# Patient Record
Sex: FEMALE | Race: BLACK OR AFRICAN AMERICAN | NOT HISPANIC OR LATINO | Employment: FULL TIME | URBAN - METROPOLITAN AREA
[De-identification: names, ages, dates, MRNs, and addresses within clinical notes are randomized per-mention and may not be internally consistent; named-entity substitution may affect disease eponyms.]

---

## 2017-02-16 ENCOUNTER — HOSPITAL ENCOUNTER (EMERGENCY)
Facility: HOSPITAL | Age: 52
Discharge: HOME | End: 2017-02-16

## 2017-02-16 VITALS
DIASTOLIC BLOOD PRESSURE: 95 MMHG | TEMPERATURE: 99 F | HEART RATE: 72 BPM | OXYGEN SATURATION: 97 % | WEIGHT: 250 LBS | SYSTOLIC BLOOD PRESSURE: 183 MMHG | BODY MASS INDEX: 39.16 KG/M2 | RESPIRATION RATE: 20 BRPM

## 2017-02-16 DIAGNOSIS — L02.11 NECK ABSCESS: Primary | ICD-10-CM

## 2017-02-16 PROCEDURE — 90471 IMMUNIZATION ADMIN: CPT | Performed by: PHYSICIAN ASSISTANT

## 2017-02-16 PROCEDURE — 99283 EMERGENCY DEPT VISIT LOW MDM: CPT | Mod: 25

## 2017-02-16 PROCEDURE — 90715 TDAP VACCINE 7 YRS/> IM: CPT | Performed by: PHYSICIAN ASSISTANT

## 2017-02-16 PROCEDURE — 10060 I&D ABSCESS SIMPLE/SINGLE: CPT

## 2017-02-16 PROCEDURE — 63600175 PHARM REV CODE 636 W HCPCS: Performed by: PHYSICIAN ASSISTANT

## 2017-02-16 PROCEDURE — 96372 THER/PROPH/DIAG INJ SC/IM: CPT

## 2017-02-16 PROCEDURE — 25000003 PHARM REV CODE 250: Performed by: PHYSICIAN ASSISTANT

## 2017-02-16 RX ORDER — KETOROLAC TROMETHAMINE 30 MG/ML
60 INJECTION, SOLUTION INTRAMUSCULAR; INTRAVENOUS
Status: COMPLETED | OUTPATIENT
Start: 2017-02-16 | End: 2017-02-16

## 2017-02-16 RX ORDER — CLINDAMYCIN HYDROCHLORIDE 150 MG/1
300 CAPSULE ORAL 4 TIMES DAILY
Qty: 56 CAPSULE | Refills: 0 | Status: SHIPPED | OUTPATIENT
Start: 2017-02-16 | End: 2017-02-23

## 2017-02-16 RX ORDER — LIDOCAINE HYDROCHLORIDE 10 MG/ML
10 INJECTION, SOLUTION EPIDURAL; INFILTRATION; INTRACAUDAL; PERINEURAL
Status: COMPLETED | OUTPATIENT
Start: 2017-02-16 | End: 2017-02-16

## 2017-02-16 RX ORDER — TRAMADOL HYDROCHLORIDE 50 MG/1
50 TABLET ORAL EVERY 6 HOURS PRN
Qty: 11 TABLET | Refills: 0 | Status: SHIPPED | OUTPATIENT
Start: 2017-02-16 | End: 2017-02-26

## 2017-02-16 RX ADMIN — KETOROLAC TROMETHAMINE 60 MG: 30 INJECTION, SOLUTION INTRAMUSCULAR at 07:02

## 2017-02-16 RX ADMIN — LIDOCAINE HYDROCHLORIDE 100 MG: 10 INJECTION, SOLUTION EPIDURAL; INFILTRATION; INTRACAUDAL; PERINEURAL at 06:02

## 2017-02-16 RX ADMIN — CLOSTRIDIUM TETANI TOXOID ANTIGEN (FORMALDEHYDE INACTIVATED), CORYNEBACTERIUM DIPHTHERIAE TOXOID ANTIGEN (FORMALDEHYDE INACTIVATED), BORDETELLA PERTUSSIS TOXOID ANTIGEN (GLUTARALDEHYDE INACTIVATED), BORDETELLA PERTUSSIS FILAMENTOUS HEMAGGLUTININ ANTIGEN (FORMALDEHYDE INACTIVATED), BORDETELLA PERTUSSIS PERTACTIN ANTIGEN, AND BORDETELLA PERTUSSIS FIMBRIAE 2/3 ANTIGEN 0.5 ML: 5; 2; 2.5; 5; 3; 5 INJECTION, SUSPENSION INTRAMUSCULAR at 06:02

## 2017-02-16 NOTE — ED AVS SNAPSHOT
[facility]  [address] [address] [address]               Hina Nunez   2017  6:00 PM   ED    Description:  Female : 1965   Department:  [facility]           Your Care was Coordinated By:     Provider Role From To    ORLANDO Pierre Physician Assistant [phone] --      Reason for Visit     Abscess           Diagnoses this Visit        Comments    Neck abscess    -  Primary       ED Disposition     None           To Do List           Follow-up Information     Follow up with Ryder Leavitt MD In 2 days.    Specialty:  Family Medicine    Why:  As needed, If symptoms worsen return to ED     Contact information:    [address] [place] [address] [id number] [phone]         These Medications        Disp Refills Start End    clindamycin (CLEOCIN) 150 MG capsule 56 capsule 0 2017    Take 2 capsules (300 mg total) by mouth 4 (four) times daily. - Oral    [facility] [address] [place] [address] [id number] [place] [address] Ph #: [phone]         [facility]     [facility] -  Assistance  Registered nurses in the [facility] provide clinical advisement, health education, appointment booking, and other advisory services.  Call for this free service at [phone].             Medications           Message regarding Medications     Verify the changes and/or additions to your medication regime listed below are the same as discussed with your clinician today.  If any of these changes or additions are incorrect, please notify your healthcare provider.        START taking these NEW medications        Refills    clindamycin (CLEOCIN) 150 MG capsule 0    Sig: Take 2 capsules (300 mg total) by mouth 4 (four) times daily.    Class: Print    Route: Oral      These medications were administered today        Dose Freq    lidocaine (PF) 10 mg/ml (1%) injection 100 mg 10 mL  ED 1 Time    Sig: 10 mLs (100 mg total) by Infiltration route ED 1 Time.    Class: Normal    Route: Infiltration    Cosign for Ordering: Required by Jose Angeles MD    Tdap vaccine injection 0.5 mL 0.5 mL Once    Sig: Inject 0.5 mLs into the muscle once.    Class: Normal    Route: Intramuscular    Cosign for Ordering: Required by OB HIM            Verify that the below list of medications is an accurate representation of the medications you are currently taking.  If none reported, the list may be blank. If incorrect, please contact your healthcare provider. Carry this list with you in case of emergency.           Current Medications     hydrocodone-acetaminophen 10-325mg (NORCO)  mg Tab Take 1 tablet by mouth every 4 (four) hours.    albuterol 90 mcg/actuation inhaler Inhale 1-2 puffs into the lungs every 6 (six) hours as needed for Wheezing.    ciprofloxacin HCl (CIPRO) 500 MG tablet Take 500 mg by mouth every 12 (twelve) hours.    clindamycin (CLEOCIN) 150 MG capsule Take 2 capsules (300 mg total) by mouth 4 (four) times daily.    metronidazole (FLAGYL) 500 MG tablet Take 500 mg by mouth 3 (three) times daily.    olmesartan-amlodipin-hcthiazid (TRIBENZOR) 20-5-12.5 mg Tab Take by mouth once daily.           Clinical Reference Information           Your Vitals Were     BP Pulse Temp Resp Weight Last Period    149/90 72 98.4 °F (36.9 °C) (Oral) 20 113.4 kg (250 lb) 01/29/2017    SpO2 BMI             98% 39.16 kg/m2         Allergies as of 2/16/2017     No Known Allergies      Immunizations Administered on Date of Encounter - 2/16/2017     Name Date Dose VIS Date Route    TDAP 2/16/2017 0.5 mL 2/24/2015 Intramuscular      ED Micro, Lab, POCT     None      ED Imaging Orders     None        Discharge Instructions         Abscess (Incision & Drainage)  An abscess (sometimes called a boil) occurs when bacteria get trapped under the skin and start to grow. Pus forms inside the abscess as  the body responds to the bacteria. An abscess can happen with an insect bite, ingrown hair, blocked oil gland, pimple, cyst, or puncture wound.  Your healthcare provider has drained the pus from your abscess. If the abscess pocket was large, your healthcare provider may have inserted gauze packing. Your provider will need to remove and possibly replace it on your next visit. You may not need antibiotics to treat a simple abscess, unless the infection is spreading into the skin around the wound (cellulitis).  Healing of the wound will take about 1 to 2 weeks, depending on the size of the abscess. Healthy tissue will grow from the bottom and sides of the opening until it seals over.  Home care  These tips can help your wound heal:  · The wound may drain for the first 2 days. Cover the wound with a clean dry dressing. If the dressing becomes soaked with blood or pus, change it.  · If a gauze packing was placed inside the abscess cavity, you may be told to remove it yourself. You may do this in the shower. Once the packing is removed, you should wash the area in the shower or bath 3 to 4 times a day, until the skin opening has closed. Make sure you wash your hands after changing the packing or cleaning the wound.  · If you were prescribed antibiotics, take them as directed until they are all gone.  · You may use acetaminophen or ibuprofen to control pain, unless another pain medicine was prescribed. If you have liver disease or ever had a stomach ulcer, talk with your doctor before using these medicines.  Follow-up care  Follow up with your healthcare provider, or as advised. If a gauze packing was inserted in your wound, it should be removed in 1 to 2 days. Check your wound every day for the signs of worsening infection listed below.  When to seek medical advice  Call your healthcare provider right away if any of these occur:  · Increasing redness or swelling  · Red streaks in the skin leading away from the  wound  · Increasing local pain or swelling  · Continued pus draining from the wound 2 days after treatment  · Fever of 100.4ºF (38ºC) or higher, or as directed by your healthcare provider  · Boil returns when you are at home  Date Last Reviewed: 9/1/2016 © 2000-2016 [employer]. [address] [place] [address]. All rights reserved. This information is not intended as a substitute for professional medical care. Always follow your healthcare professional's instructions.          [facility] Sign-Up     Activating your [facility] account is as easy as 1-2-3!     1) Visit [email or web address], select Sign Up Now, enter this activation code and your date of birth, then select Next.  [email or web address]  Expires: 4/2/2017  7:04 PM      2) Create a username and password to use when you visit [facility] in the future and select a security question in case you lose your password and select Next.    3) Enter your e-mail address and click Sign Up!    Additional Information  If you have questions, please e-mail [email or web address] or call [phone] to talk to our [facility] staff. Remember, [facility] is NOT to be used for urgent needs. For medical emergencies, dial 911.         Smoking Cessation     If you would like to quit smoking:   You may be eligible for free services if you are a [place] resident and started smoking cigarettes before September 1, 1988.  Call the Smoking Cessation Trust (SCT) toll free at (146) 112-0084 or (560) 098-0724.   Call 4-926-QUIT-NOW if you do not meet the above criteria.             [facility] complies with applicable Federal civil rights laws and does not discriminate on the basis of race, color, national origin, age, disability, or sex.        Language Assistance Services     ATTENTION: Language assistance services are available, free of charge. Please call [phone].      ATENCIÓN: Si habla español, tiene a olmos disposición servicios gratuitos de  asistencia lingüística. [facility] [phone].     PHOENIX Ý: N?u b?n nói Ti?ng Vi?t, có các d?ch v? h? tr? ngôn ng? mi?n phí mitzyh cho b?n. G?i s? [phone].

## 2017-02-17 NOTE — ED PROVIDER NOTES
Encounter Date: 2/16/2017       History     Chief Complaint   Patient presents with    Abscess     abscess back of neck for 1 week has been draining      Review of patient's allergies indicates:  No Known Allergies  HPI Comments: Pt reports to ED c/o abscess. Abscess is to posterior neck and has been there since this weekend. Pt says it started this weekend as a little bump and has gotten progressively worse. Associated sxs include drainage and pain.  No alleviating or exacerbating factors.  Pt denies any hx of diabetes, fever, nausea, vomiting, headache, neck stiffness, or any other associated sxs.      The history is provided by the patient.     Past Medical History   Diagnosis Date    Asthma     Fibroids     Hypertension      Past Medical History Pertinent Negatives   Diagnosis Date Noted    Abnormal Pap smear of vagina 6/25/2015    Anemia 6/25/2015    Blood dyscrasia 6/25/2015    Breast cancer 6/25/2015    Complication of anesthesia 6/25/2015    Coronary artery disease 6/25/2015    Deep vein thrombosis 6/25/2015    Diabetes mellitus 6/25/2015    Herpes simplex without mention of complication 6/25/2015    HIV infection 6/25/2015    Hyperlipidemia 6/25/2015    Infertility, female 6/25/2015    Liver disease 6/25/2015    Mental disorder 6/25/2015    Postpartum depression 6/25/2015    Rh incompatibility 6/25/2015    Seizures 6/25/2015    Sickle cell anemia 6/25/2015    Stroke 6/25/2015    Systemic lupus erythematosus 6/25/2015    Thyroid disease 6/25/2015    Trauma 6/25/2015    Unspecified disorder of kidney and ureter 6/25/2015    Varicosities 6/25/2015     Past Surgical History   Procedure Laterality Date    Gallbladder surgery      Cholecystectomy      Dilation and curettage of uterus       Family History   Problem Relation Age of Onset    Diabetes Mother     Hypertension Mother     Hypertension Father     Breast cancer Neg Hx     Cancer Neg Hx     Colon cancer Neg Hx      Eclampsia Neg Hx     Miscarriages / Stillbirths Neg Hx     Ovarian cancer Neg Hx      labor Neg Hx     Stroke Neg Hx      Social History   Substance Use Topics    Smoking status: Current Every Day Smoker     Packs/day: 0.50     Types: Cigarettes    Smokeless tobacco: Never Used    Alcohol use Yes      Comment: social     Review of Systems   Constitutional: Negative for fever.   HENT: Negative for sore throat.    Respiratory: Negative for shortness of breath.    Cardiovascular: Negative for chest pain.   Gastrointestinal: Negative for nausea.   Genitourinary: Negative for dysuria.   Musculoskeletal: Negative for back pain.   Skin: Positive for wound (abscess to neck). Negative for rash.   Neurological: Negative for weakness.   Hematological: Does not bruise/bleed easily.   All other systems reviewed and are negative.      Physical Exam   Initial Vitals   BP Pulse Resp Temp SpO2   17 1757 17 1757 17 1757 17 1757 17 1757   149/90 72 20 98.4 °F (36.9 °C) 98 %     Physical Exam    Nursing note and vitals reviewed.  Constitutional: She appears well-developed and well-nourished.   HENT:   Head: Normocephalic and atraumatic.   Eyes: EOM are normal. Pupils are equal, round, and reactive to light.   Neck: Normal range of motion. Neck supple.   Cardiovascular: Normal rate and regular rhythm.   Pulmonary/Chest: Breath sounds normal.   Abdominal: Soft.   Musculoskeletal: Normal range of motion.   Neurological: She is alert and oriented to person, place, and time.   Skin:   Midline abscess with 4 cm area of induration to posterior neck. No fluctuance or erythema.           ED Course   I & D - Incision and Drainage  Date/Time: 2017 7:02 PM  Performed by: POLLY MIX  Authorized by: AMY RUIZ   Consent Done: Yes  Consent: Verbal consent obtained.  Risks and benefits: risks, benefits and alternatives were discussed  Consent given by: patient  Patient  understanding: patient states understanding of the procedure being performed  Patient identity confirmed: , MRN and name  Type: abscess  Body area: head/neck  Location details: neck  Anesthesia: local infiltration    Anesthesia:  Anesthesia: local infiltration  Local Anesthetic: lidocaine 1% without epinephrine   Anesthetic total: 3 mL  Patient sedated: no  Scalpel size: 11  Incision type: single straight  Complexity: simple  Drainage: pus  Drainage amount: scant  Wound treatment: incision,  drainage,  deloculation,  expression of material and  wound left open  Complications: No  Specimens: No  Implants: No  Patient tolerance: Patient tolerated the procedure well with no immediate complications        Labs Reviewed - No data to display                            ED Course     Clinical Impression:   The encounter diagnosis was Neck abscess.          ORLANDO Pierre  17 [phone]

## 2017-02-17 NOTE — ED NOTES
Pt states no further needs/concerns. resp even, unlabored. No distress noted. Pt AAOx3. ORLANDO Mendez notified of pt's vital signs prior to d/c and PA states ok for discharge. No adverse reaction noted post med admin. Pt denies any pain at this time post shot admin.

## 2017-02-17 NOTE — DISCHARGE INSTRUCTIONS
Abscess (Incision & Drainage)  An abscess (sometimes called a boil) occurs when bacteria get trapped under the skin and start to grow. Pus forms inside the abscess as the body responds to the bacteria. An abscess can happen with an insect bite, ingrown hair, blocked oil gland, pimple, cyst, or puncture wound.  Your healthcare provider has drained the pus from your abscess. If the abscess pocket was large, your healthcare provider may have inserted gauze packing. Your provider will need to remove and possibly replace it on your next visit. You may not need antibiotics to treat a simple abscess, unless the infection is spreading into the skin around the wound (cellulitis).  Healing of the wound will take about 1 to 2 weeks, depending on the size of the abscess. Healthy tissue will grow from the bottom and sides of the opening until it seals over.  Home care  These tips can help your wound heal:  · The wound may drain for the first 2 days. Cover the wound with a clean dry dressing. If the dressing becomes soaked with blood or pus, change it.  · If a gauze packing was placed inside the abscess cavity, you may be told to remove it yourself. You may do this in the shower. Once the packing is removed, you should wash the area in the shower or bath 3 to 4 times a day, until the skin opening has closed. Make sure you wash your hands after changing the packing or cleaning the wound.  · If you were prescribed antibiotics, take them as directed until they are all gone.  · You may use acetaminophen or ibuprofen to control pain, unless another pain medicine was prescribed. If you have liver disease or ever had a stomach ulcer, talk with your doctor before using these medicines.  Follow-up care  Follow up with your healthcare provider, or as advised. If a gauze packing was inserted in your wound, it should be removed in 1 to 2 days. Check your wound every day for the signs of worsening infection listed below.  When to seek  medical advice  Call your healthcare provider right away if any of these occur:  · Increasing redness or swelling  · Red streaks in the skin leading away from the wound  · Increasing local pain or swelling  · Continued pus draining from the wound 2 days after treatment  · Fever of 100.4ºF (38ºC) or higher, or as directed by your healthcare provider  · Boil returns when you are at home  Date Last Reviewed: 9/1/2016  © 4280-1276 [employer]. [address] [place] [address]. All rights reserved. This information is not intended as a substitute for professional medical care. Always follow your healthcare professional's instructions.

## 2017-04-10 ENCOUNTER — HOSPITAL ENCOUNTER (EMERGENCY)
Facility: HOSPITAL | Age: 52
Discharge: HOME | End: 2017-04-10
Attending: EMERGENCY MEDICINE
Payer: COMMERCIAL

## 2017-04-10 VITALS
TEMPERATURE: 99 F | BODY MASS INDEX: 39.37 KG/M2 | OXYGEN SATURATION: 96 % | RESPIRATION RATE: 16 BRPM | WEIGHT: 245 LBS | HEIGHT: 66 IN | SYSTOLIC BLOOD PRESSURE: 131 MMHG | DIASTOLIC BLOOD PRESSURE: 81 MMHG | HEART RATE: 74 BPM

## 2017-04-10 DIAGNOSIS — R05.9 COUGH: ICD-10-CM

## 2017-04-10 DIAGNOSIS — R06.2 WHEEZING: ICD-10-CM

## 2017-04-10 DIAGNOSIS — Z72.0 TOBACCO ABUSE DISORDER: ICD-10-CM

## 2017-04-10 DIAGNOSIS — J41.0 SIMPLE CHRONIC BRONCHITIS: Primary | ICD-10-CM

## 2017-04-10 PROCEDURE — 99900035 HC TECH TIME PER 15 MIN (STAT)

## 2017-04-10 PROCEDURE — 63600175 PHARM REV CODE 636 W HCPCS: Performed by: EMERGENCY MEDICINE

## 2017-04-10 PROCEDURE — 25000242 PHARM REV CODE 250 ALT 637 W/ HCPCS: Performed by: EMERGENCY MEDICINE

## 2017-04-10 PROCEDURE — 94640 AIRWAY INHALATION TREATMENT: CPT

## 2017-04-10 PROCEDURE — 99284 EMERGENCY DEPT VISIT MOD MDM: CPT

## 2017-04-10 RX ORDER — PREDNISONE 20 MG/1
40 TABLET ORAL DAILY
Qty: 10 TABLET | Refills: 0 | Status: SHIPPED | OUTPATIENT
Start: 2017-04-10 | End: 2017-04-15

## 2017-04-10 RX ORDER — ALBUTEROL SULFATE 90 UG/1
1-2 AEROSOL, METERED RESPIRATORY (INHALATION) EVERY 6 HOURS PRN
Qty: 1 INHALER | Refills: 0 | OUTPATIENT
Start: 2017-04-10 | End: 2021-12-03

## 2017-04-10 RX ORDER — PREDNISONE 20 MG/1
60 TABLET ORAL
Status: COMPLETED | OUTPATIENT
Start: 2017-04-10 | End: 2017-04-10

## 2017-04-10 RX ORDER — IPRATROPIUM BROMIDE AND ALBUTEROL SULFATE 2.5; .5 MG/3ML; MG/3ML
3 SOLUTION RESPIRATORY (INHALATION)
Status: COMPLETED | OUTPATIENT
Start: 2017-04-10 | End: 2017-04-10

## 2017-04-10 RX ORDER — BENZONATATE 100 MG/1
100 CAPSULE ORAL 3 TIMES DAILY PRN
Qty: 20 CAPSULE | Refills: 0 | Status: SHIPPED | OUTPATIENT
Start: 2017-04-10 | End: 2017-04-20

## 2017-04-10 RX ADMIN — IPRATROPIUM BROMIDE AND ALBUTEROL SULFATE 3 ML: .5; 3 SOLUTION RESPIRATORY (INHALATION) at 05:04

## 2017-04-10 RX ADMIN — PREDNISONE 60 MG: 20 TABLET ORAL at 05:04

## 2017-04-10 NOTE — ED PROVIDER NOTES
Encounter Date: 4/10/2017       History     Chief Complaint   Patient presents with    Cough     x 1 month     Review of patient's allergies indicates:  No Known Allergies  Patient is a 51 y.o. female presenting with the following complaint: cough. The history is provided by the patient.   Cough   This is a chronic problem. The current episode started several weeks ago. The problem occurs every few hours. The problem has been gradually worsening. The cough is productive of sputum. There has been no fever. Associated symptoms include rhinorrhea and wheezing. Pertinent negatives include no chest pain, no chills, no sweats, no weight loss, no ear congestion, no ear pain, no sore throat, no myalgias and no shortness of breath. She has tried cough syrup and decongestants for the symptoms. She is a smoker. Her past medical history is significant for bronchitis, pneumonia and asthma.     Past Medical History:   Diagnosis Date    Asthma     Fibroids     Hypertension      Past Surgical History:   Procedure Laterality Date    CHOLECYSTECTOMY      DILATION AND CURETTAGE OF UTERUS      GALLBLADDER SURGERY       Family History   Problem Relation Age of Onset    Diabetes Mother     Hypertension Mother     Hypertension Father     Breast cancer Neg Hx     Cancer Neg Hx     Colon cancer Neg Hx     Eclampsia Neg Hx     Miscarriages / Stillbirths Neg Hx     Ovarian cancer Neg Hx      labor Neg Hx     Stroke Neg Hx      Social History   Substance Use Topics    Smoking status: Current Every Day Smoker     Packs/day: 0.50     Types: Cigarettes    Smokeless tobacco: Never Used    Alcohol use Yes      Comment: social     Review of Systems   Constitutional: Negative for chills and weight loss.   HENT: Positive for rhinorrhea. Negative for ear pain and sore throat.    Respiratory: Positive for cough and wheezing. Negative for shortness of breath.    Cardiovascular: Negative for chest pain.   Musculoskeletal:  Negative for myalgias.       Physical Exam   Initial Vitals   BP Pulse Resp Temp SpO2   04/10/17 1620 04/10/17 1620 04/10/17 1620 04/10/17 1620 04/10/17 1620   131/81 80 20 98.6 °F (37 °C) 96 %     Physical Exam    Nursing Notes and Vital Signs Reviewed.  Constitutional:  Well developed, well nourished.  Awake & alert.  She is in no apparent distress  Head:  Atraumatic.  Normocephalic.    Eyes:  PERRL.  EOMI.  Conjunctivae are not pale. No scleral icterus.  ENT:  Mucous membranes are moist and intact.  Oropharynx is clear and symmetric.    Neck:  Supple.  No JVD.  No lymphadenopathy.  Cardiovascular:  Regular rate.  Regular rhythm.  No murmurs, rubs, or gallops.  Distal pulses are 2+ and symmetric.  Pulmonary/Chest:  No evidence of respiratory distress.  Diminished bilaterally with expiratory wheezing, no rales or rhonchi. Left chest wall tenderness.   Abdominal:  Soft and non-distended.  There is no tenderness.  No rebound, guarding, or rigidity.  No organomegaly.  Good bowel sounds.    Genitourinary: No CVA tenderness.  Musculoskeletal:  No edema.   No cyanosis.  No clubbing.  Moves all four extremities.   No calf tenderness.  Skin:  Skin is warm and dry.      Neurological:  Alert, awake, and appropriate.  Normal speech.  No acute focal neurological deficits are appreciated.  Psychiatric:  Good eye contact.  Appropriate in content/context. Normal affect.    ED Course   Procedures  Labs Reviewed - No data to display     Imaging Results         X-Ray Chest PA And Lateral (Final result) Result time:  04/10/17 17:26:15    Final result by Ousmane Mathias Jr., MD (04/10/17 17:26:15)    Impression:          No acute findings       Electronically signed by: OUSMANE MATHIAS MD  Date:     04/10/17  Time:    17:26     Narrative:    EXAM:   XR CHEST PA AND LATERAL    CLINICAL HISTORY:   Cough    COMPARISON:  None    FINDINGS:   Heart size and pulmonary vascularity appear normal. Lungs are well aerated and appear clear. No  suspicious mass, infiltrate or effusion.                    Additional MDM:   Smoking Cessation: The patient is a smoker. The patient was counseled on smoking cessation for: 3 minutes. The patient was counseled on tobacco related  health complications.                 ED Course       5:31 PM Reassessed the pt.  The pt is resting comfortably and is NAD.  Discussed test results, shared treatment plan, specific conditions for return, and the need for f/u.  Answered their questions at this time.  Pt understands and agrees to the plan.  The pt has remained hemodynamically stable through ED course and is stable for discharge.     Clinical Impression:     1. Simple chronic bronchitis    2. Cough    3. Tobacco abuse disorder    4. Wheezing        Disposition:   Disposition: Discharged  Condition: Stable       Carolee Agarwal MD  04/10/17 1732       Carolee Agarwal MD  04/10/17 1730

## 2017-04-10 NOTE — ED NOTES
LOC: The patient is awake, alert and aware of environment with an appropriate affect, the patient is oriented x 3 and speaking appropriately.  APPEARANCE: Patient resting comfortably and in no acute distress, patient is clean and well groomed, patient's clothing is properly fastened.  HEENT: Brief WNL except nasal congestion and post nasal drip  SKIN: Brief WNL.   MUSCULOSKELETAL: Brief WNL  RESPIRATORY: Brief WNL except productive cough white x 1 month.   CARDIAC: Brief WNL except chest soreness   GASTRO: Brief WNL  : Brief WNL  Peripheral Vasc: Brief WNL  NEURO: Brief WNL  PSYCH: Brief WNL

## 2017-04-10 NOTE — ED AVS SNAPSHOT
[facility]  [address] [facility] [address]  [facility] [address]               Hina Nunez   4/10/2017  4:22 PM   ED    Description:  Female : 1965   Department:  [facility]           Your Care was Coordinated By:     Provider Role From To    Carolee Agarwal MD Attending Provider 04/10/17 0099 --      Reason for Visit     Cough           Diagnoses this Visit        Comments    Simple chronic bronchitis    -  Primary     Cough         Tobacco abuse disorder         Wheezing           ED Disposition     None           To Do List           Follow-up Information     Follow up with Ryder Leavitt MD. Schedule an appointment as soon as possible for a visit in 2 days.    Specialty:  Family Medicine    Why:  Return to the Emergency Room, If symptoms worsen    Contact information:    [address] [place], [place] [address]  [facility] [address]  [phone]         These Medications        Disp Refills Start End    albuterol 90 mcg/actuation inhaler 1 Inhaler 0 4/10/2017 5/10/2017    Inhale 1-2 puffs into the lungs every 6 (six) hours as needed for Wheezing. - Inhalation    Pharmacy: [facility] Ph #: [phone]       predniSONE (DELTASONE) 20 MG tablet 10 tablet 0 4/10/2017 4/15/2017    Take 2 tablets (40 mg total) by mouth once daily. - Oral    Pharmacy: [facility] [id number] Ph #: [phone]       benzonatate (TESSALON) 100 MG capsule 20 capsule 0 4/10/2017 2017    Take 1 capsule (100 mg total) by mouth 3 (three) times daily as needed for Cough. - Oral    Pharmacy: [facility] [id number] Ph #: [phone]         [facility]     [facility] Nurse Care [facility] - 24/ Assistance  Unless otherwise directed by your provider, please contact [facility], our nurse care line that is available for 24/7 assistance.     Registered  nurses in the [facility] provide: appointment scheduling, clinical advisement, health education, and other advisory services.  Call: [phone] (toll free)               Medications           Message regarding Medications     Verify the changes and/or additions to your medication regime listed below are the same as discussed with your clinician today.  If any of these changes or additions are incorrect, please notify your healthcare provider.        START taking these NEW medications        Refills    predniSONE (DELTASONE) 20 MG tablet 0    Sig: Take 2 tablets (40 mg total) by mouth once daily.    Class: Print    Route: Oral    benzonatate (TESSALON) 100 MG capsule 0    Sig: Take 1 capsule (100 mg total) by mouth 3 (three) times daily as needed for Cough.    Class: Print    Route: Oral      These medications were administered today        Dose Freq    albuterol-ipratropium 2.5mg-0.5mg/3mL nebulizer solution 3 mL 3 mL ED 1 Time    Sig: Take 3 mLs by nebulization ED 1 Time.    Class: Normal    Route: Nebulization    predniSONE tablet 60 mg 60 mg ED 1 Time    Sig: Take 3 tablets (60 mg total) by mouth ED 1 Time.    Class: Normal    Route: Oral           Verify that the below list of medications is an accurate representation of the medications you are currently taking.  If none reported, the list may be blank. If incorrect, please contact your healthcare provider. Carry this list with you in case of emergency.           Current Medications     olmesartan-amlodipin-hcthiazid (TRIBENZOR) 20-5-12.5 mg Tab Take by mouth once daily.    albuterol 90 mcg/actuation inhaler Inhale 1-2 puffs into the lungs every 6 (six) hours as needed for Wheezing.    benzonatate (TESSALON) 100 MG capsule Take 1 capsule (100 mg total) by mouth 3 (three) times daily as needed for Cough.    ciprofloxacin HCl (CIPRO) 500 MG tablet Take 500 mg by mouth every 12 (twelve) hours.    hydrocodone-acetaminophen 10-325mg (NORCO)  mg  "Tab Take 1 tablet by mouth every 4 (four) hours.    metronidazole (FLAGYL) 500 MG tablet Take 500 mg by mouth 3 (three) times daily.    predniSONE (DELTASONE) 20 MG tablet Take 2 tablets (40 mg total) by mouth once daily.           Clinical Reference Information           Your Vitals Were     BP Pulse Temp Resp Height Weight    131/81 (BP Location: Right arm, Patient Position: Sitting) 74 98.6 °F (37 °C) (Oral) 16 5' 6" (1.676 m) 111.1 kg (245 lb)    SpO2 BMI             96% 39.54 kg/m2         Allergies as of 4/10/2017     No Known Allergies      Immunizations Administered on Date of Encounter - 4/10/2017     None      ED Micro, Lab, POCT     None      ED Imaging Orders     Start Ordered       Status Ordering Provider    04/10/17 1702 04/10/17 1701  X-Ray Chest PA And Lateral  1 time imaging      Final result       Discharge References/Attachments     BRONCHITIS WITH WHEEZING (ADULT) (ENGLISH)    SMOKING CESSATION (ENGLISH)      [facility] Sign-Up     Activating your [facility] account is as easy as 1-2-3!     1) Visit [email or web address], select Sign Up Now, enter this activation code and your date of birth, then select Next.  [email or web address]  Expires: 5/25/2017  5:31 PM      2) Create a username and password to use when you visit [facility] in the future and select a security question in case you lose your password and select Next.    3) Enter your e-mail address and click Sign Up!    Additional Information  If you have questions, please e-mail [email or web address] or call [phone] to talk to our [facility] staff. Remember, [facility] is NOT to be used for urgent needs. For medical emergencies, dial 911.         Smoking Cessation     If you would like to quit smoking:   You may be eligible for free services if you are a [place] resident and started smoking cigarettes before September 1, 1988.  Call the Smoking Cessation Trust (SCT) toll free at (727) 244-6353 or (679) 198-0516.   Call 3-800-QUIT-NOW if you do " not meet the above criteria.   Contact us via email: [email or web address]   View our website for more information: [email or web address]         [facility] complies with applicable Federal civil rights laws and does not discriminate on the basis of race, color, national origin, age, disability, or sex.        Language Assistance Services     ATTENTION: Language assistance services are available, free of charge. Please call [phone].      ATENCIÓN: Si habla español, tiene a olmos disposición servicios gratuitos de asistencia lingüística. Llame al [phone].     CHÚ Ý: N?u b?n nói Ti?ng Vi?t, có các d?ch v? h? tr? ngôn ng? mi?n phí dành cho b?n. G?i s? [phone].

## 2017-11-13 ENCOUNTER — HOSPITAL ENCOUNTER (EMERGENCY)
Facility: HOSPITAL | Age: 52
Discharge: HOME | End: 2017-11-13
Payer: COMMERCIAL

## 2017-11-13 VITALS
TEMPERATURE: 98 F | SYSTOLIC BLOOD PRESSURE: 131 MMHG | HEIGHT: 67 IN | RESPIRATION RATE: 18 BRPM | OXYGEN SATURATION: 97 % | BODY MASS INDEX: 40.02 KG/M2 | HEART RATE: 68 BPM | DIASTOLIC BLOOD PRESSURE: 79 MMHG | WEIGHT: 255 LBS

## 2017-11-13 DIAGNOSIS — J40 BRONCHITIS: ICD-10-CM

## 2017-11-13 DIAGNOSIS — R05.9 COUGH: Primary | ICD-10-CM

## 2017-11-13 DIAGNOSIS — R07.9 CHEST PAIN, UNSPECIFIED TYPE: ICD-10-CM

## 2017-11-13 LAB
ALBUMIN SERPL BCP-MCNC: 3.5 G/DL
ALP SERPL-CCNC: 101 U/L
ALT SERPL W/O P-5'-P-CCNC: 18 U/L
ANION GAP SERPL CALC-SCNC: 9 MMOL/L
AST SERPL-CCNC: 30 U/L
BASOPHILS # BLD AUTO: 0.03 K/UL
BASOPHILS NFR BLD: 0.5 %
BILIRUB SERPL-MCNC: 0.7 MG/DL
BUN SERPL-MCNC: 7 MG/DL
CALCIUM SERPL-MCNC: 9.6 MG/DL
CHLORIDE SERPL-SCNC: 108 MMOL/L
CO2 SERPL-SCNC: 24 MMOL/L
CREAT SERPL-MCNC: 0.9 MG/DL
DIFFERENTIAL METHOD: ABNORMAL
EOSINOPHIL # BLD AUTO: 0.2 K/UL
EOSINOPHIL NFR BLD: 3.3 %
ERYTHROCYTE [DISTWIDTH] IN BLOOD BY AUTOMATED COUNT: 14.6 %
EST. GFR  (AFRICAN AMERICAN): >60 ML/MIN/1.73 M^2
EST. GFR  (NON AFRICAN AMERICAN): >60 ML/MIN/1.73 M^2
GLUCOSE SERPL-MCNC: 94 MG/DL
HCT VFR BLD AUTO: 39.2 %
HGB BLD-MCNC: 13.3 G/DL
LYMPHOCYTES # BLD AUTO: 2.8 K/UL
LYMPHOCYTES NFR BLD: 48.5 %
MCH RBC QN AUTO: 29.4 PG
MCHC RBC AUTO-ENTMCNC: 33.9 G/DL
MCV RBC AUTO: 87 FL
MONOCYTES # BLD AUTO: 0.6 K/UL
MONOCYTES NFR BLD: 10.3 %
NEUTROPHILS # BLD AUTO: 2.2 K/UL
NEUTROPHILS NFR BLD: 37.2 %
PLATELET # BLD AUTO: 330 K/UL
PMV BLD AUTO: 9.6 FL
POTASSIUM SERPL-SCNC: 3.8 MMOL/L
PROT SERPL-MCNC: 7.4 G/DL
RBC # BLD AUTO: 4.53 M/UL
SODIUM SERPL-SCNC: 141 MMOL/L
TROPONIN I SERPL DL<=0.01 NG/ML-MCNC: <0.006 NG/ML
WBC # BLD AUTO: 5.84 K/UL

## 2017-11-13 PROCEDURE — 99284 EMERGENCY DEPT VISIT MOD MDM: CPT | Mod: 25

## 2017-11-13 PROCEDURE — 84484 ASSAY OF TROPONIN QUANT: CPT

## 2017-11-13 PROCEDURE — 80053 COMPREHEN METABOLIC PANEL: CPT

## 2017-11-13 PROCEDURE — 85025 COMPLETE CBC W/AUTO DIFF WBC: CPT

## 2017-11-13 PROCEDURE — 93010 ELECTROCARDIOGRAM REPORT: CPT | Mod: ,,, | Performed by: NUCLEAR MEDICINE

## 2017-11-13 PROCEDURE — 93005 ELECTROCARDIOGRAM TRACING: CPT

## 2017-11-13 PROCEDURE — 93005 ELECTROCARDIOGRAM TRACING: CPT | Performed by: GENERAL PRACTICE

## 2017-11-13 PROCEDURE — 99900035 HC TECH TIME PER 15 MIN (STAT): Performed by: GENERAL PRACTICE

## 2017-11-13 RX ORDER — GUAIFENESIN AND DEXTROMETHORPHAN HYDROBROMIDE 60; 1200 MG/1; MG/1
1 TABLET, EXTENDED RELEASE ORAL EVERY 12 HOURS
Qty: 20 TABLET | Refills: 0 | Status: SHIPPED | OUTPATIENT
Start: 2017-11-13 | End: 2017-11-23

## 2017-11-13 RX ORDER — AZITHROMYCIN 250 MG/1
250 TABLET, FILM COATED ORAL DAILY
Qty: 6 TABLET | Refills: 0 | Status: SHIPPED | OUTPATIENT
Start: 2017-11-13 | End: 2017-11-23

## 2017-11-13 NOTE — ED PROVIDER NOTES
History      Chief Complaint   Patient presents with    Cough     cough for 2 weeks and productive of whitish secretions and left side chest wall pain rib area for 1 week.       Review of patient's allergies indicates:  No Known Allergies     HPI   HPI    2017, 12:58 PM   History obtained from the patient      History of Present Illness: Hina Nunez is a 52 y.o. female patient who presents to the Emergency Department for productive cough for 2 weeks and left chest pain for 1 week.  She says pain is constant, achy.  Denies sob, n/v, fever. Admits to nasal congestion and rhinorrhea.  Symptoms are moderate in severity.     No further complaints or concerns at this time.           PCP: Ryder Leavitt MD       Past Medical History:  Past Medical History:   Diagnosis Date    Asthma     Fibroids     Hypertension          Past Surgical History:  Past Surgical History:   Procedure Laterality Date    CHOLECYSTECTOMY      DILATION AND CURETTAGE OF UTERUS      GALLBLADDER SURGERY             Family History:  Family History   Problem Relation Age of Onset    Diabetes Mother     Hypertension Mother     Hypertension Father     Breast cancer Neg Hx     Cancer Neg Hx     Colon cancer Neg Hx     Eclampsia Neg Hx     Miscarriages / Stillbirths Neg Hx     Ovarian cancer Neg Hx      labor Neg Hx     Stroke Neg Hx            Social History:  Social History     Social History Main Topics    Smoking status: Current Every Day Smoker     Packs/day: 0.50     Types: Cigarettes    Smokeless tobacco: Never Used    Alcohol use Yes      Comment: social    Drug use: No    Sexual activity: Yes     Partners: Male       ROS     Review of Systems   Constitutional: Negative for chills and fever.   HENT: Negative for sore throat.    Respiratory: Positive for cough. Negative for shortness of breath.    Cardiovascular: Positive for chest pain. Negative for palpitations.   Gastrointestinal: Negative for  "nausea and vomiting.   Genitourinary: Negative for dysuria.   Musculoskeletal: Negative for back pain.   Skin: Negative for rash.   Neurological: Negative for weakness.   Hematological: Does not bruise/bleed easily.   All other systems reviewed and are negative.      Physical Exam      Initial Vitals [11/13/17 1201]   BP Pulse Resp Temp SpO2   (!) 146/72 81 20 98.2 °F (36.8 °C) 97 %      MAP       96.67         Physical Exam  Vital signs and nursing notes reviewed.  Constitutional: Patient is in NAD. Awake and alert. Well-developed and well-nourished.  Head: Atraumatic. Normocephalic.  Eyes: PERRL. EOM intact. Conjunctivae nl. No scleral icterus.  ENT: Mucous membranes are moist. Oropharynx is clear.  No facial ttp  Neck: Supple. No JVD. No lymphadenopathy.  No meningismus  Cardiovascular: Regular rate and rhythm. No murmurs, rubs, or gallops. Distal pulses are 2+ and symmetric.  Pulmonary/Chest: No respiratory distress. Clear to auscultation bilaterally. No wheezing, rales, or rhonchi.  Abdominal: Soft. Non-distended. No TTP. No rebound, guarding, or rigidity. Good bowel sounds.  Genitourinary: No CVA tenderness  Musculoskeletal: Moves all extremities. No edema.   Skin: Warm and dry.  Neurological: Awake and alert. No acute focal neurological deficits are appreciated.  Psychiatric: Normal affect. Good eye contact. Appropriate in content.      ED Course          Procedures  ED Vital Signs:  Vitals:    11/13/17 1201 11/13/17 1424   BP: (!) 146/72 131/79   Pulse: 81 68   Resp: 20 18   Temp: 98.2 °F (36.8 °C) 98.1 °F (36.7 °C)   TempSrc: Oral Oral   SpO2: 97% 97%   Weight: 115.7 kg (255 lb)    Height: 5' 7" (1.702 m)      EKG INTERPRETATION:  1:04 PM    VR:  67  No STEMI  NSR  Inverted t wave lead v2    Results for orders placed or performed during the hospital encounter of 11/13/17   Troponin I   Result Value Ref Range    Troponin I <0.006 0.000 - 0.026 ng/mL   CBC auto differential   Result Value Ref Range    WBC " 5.84 3.90 - 12.70 K/uL    RBC 4.53 4.00 - 5.40 M/uL    Hemoglobin 13.3 12.0 - 16.0 g/dL    Hematocrit 39.2 37.0 - 48.5 %    MCV 87 82 - 98 fL    MCH 29.4 27.0 - 31.0 pg    MCHC 33.9 32.0 - 36.0 g/dL    RDW 14.6 (H) 11.5 - 14.5 %    Platelets 330 150 - 350 K/uL    MPV 9.6 9.2 - 12.9 fL    Gran # 2.2 1.8 - 7.7 K/uL    Lymph # 2.8 1.0 - 4.8 K/uL    Mono # 0.6 0.3 - 1.0 K/uL    Eos # 0.2 0.0 - 0.5 K/uL    Baso # 0.03 0.00 - 0.20 K/uL    Gran% 37.2 (L) 38.0 - 73.0 %    Lymph% 48.5 (H) 18.0 - 48.0 %    Mono% 10.3 4.0 - 15.0 %    Eosinophil% 3.3 0.0 - 8.0 %    Basophil% 0.5 0.0 - 1.9 %    Differential Method Automated    Comprehensive metabolic panel   Result Value Ref Range    Sodium 141 136 - 145 mmol/L    Potassium 3.8 3.5 - 5.1 mmol/L    Chloride 108 95 - 110 mmol/L    CO2 24 23 - 29 mmol/L    Glucose 94 70 - 110 mg/dL    BUN, Bld 7 6 - 20 mg/dL    Creatinine 0.9 0.5 - 1.4 mg/dL    Calcium 9.6 8.7 - 10.5 mg/dL    Total Protein 7.4 6.0 - 8.4 g/dL    Albumin 3.5 3.5 - 5.2 g/dL    Total Bilirubin 0.7 0.1 - 1.0 mg/dL    Alkaline Phosphatase 101 55 - 135 U/L    AST 30 10 - 40 U/L    ALT 18 10 - 44 U/L    Anion Gap 9 8 - 16 mmol/L    eGFR if African American >60.0 >60 mL/min/1.73 m^2    eGFR if non African American >60.0 >60 mL/min/1.73 m^2             Imaging Results:  Imaging Results          X-Ray Chest PA And Lateral (Final result)  Result time 11/13/17 12:46:55    Final result by Jann Lozano MD (11/13/17 12:46:55)                 Impression:         Negative chest radiograph.            Electronically signed by: JANN LOZANO MD  Date:     11/13/17  Time:    12:46              Narrative:    Exam: Two-view chest radiograph    Clinical History: .  Cough    Comparison: None.    Findings:   The lungs are clear. The cardiac silhouette is within normal limits. No pleural effusion or pneumothorax. The bones are intact.                                 The Emergency Provider reviewed the vital signs and test results, which are  outlined above.    ED Discussion             Medication(s) given in the ER:  Medications - No data to display        Follow-up Information     Ryder Leavitt MD In 2 days.    Specialty:  Family Medicine  Why:  Discuss your chest pain and cough with Dr Leavitt  Contact information:  [address]  [facility] [address]  [phone]             [facility].    Specialty:  Emergency Medicine  Why:  If symptoms worsen  Contact information:  [address] [address] [address] [phone]                     Discharge Medication List as of 11/13/2017  2:13 PM      START taking these medications    Details   azithromycin (Z-CARA) 250 MG tablet Take 1 tablet (250 mg total) by mouth once daily. Take first 2 tablets together, then 1 every day until finished., Starting Mon 11/13/2017, Until Thu 11/23/2017, Print      MUCINEX DM 60-1,200 mg Tb12 Take 1 tablet by mouth every 12 (twelve) hours., Starting Mon 11/13/2017, Until Thu 11/23/2017, Print                Medical Decision Making      Pt agrees to see Dr Leavitt in next 1-2 days to discuss chest pain.  She does have chest wall tenderness and probably having pain from coughing.  She says pain is constant but worse with cough.    All findings were reviewed with the patient/family in detail.   All remaining questions and concerns were addressed at that time.  Patient/family has been counseled regarding the need for follow-up as well as the indication to return to the emergency room should new or worrisome developments occur.        MDM   Additional MDM:     BRENDEN Score:   Age over 65:                                    0.00   > or = to 3 CAD risk factors:           1.00  Established CAD:                            0.00  > or = to 2 anginal events in the past 24 hours: 1.00  Use of ASA in past 7 days:              0.00  Elevated Enzymes:                         0.00  ST Depression > or = to 0.05 mV:  0.00  BRENDEN score: 2             Clinical  Impression:        ICD-10-CM ICD-9-CM   1. Cough R05 786.2   2. Chest pain, unspecified type R07.9 786.50   3. Bronchitis J40 490             Florence Murguia PA-C  11/13/17 1502

## 2020-10-02 ENCOUNTER — HOSPITAL ENCOUNTER (OUTPATIENT)
Dept: RADIOLOGY | Facility: HOSPITAL | Age: 55
Discharge: HOME | End: 2020-10-02
Attending: FAMILY MEDICINE
Payer: MEDICAID

## 2020-10-02 DIAGNOSIS — M25.561 RIGHT KNEE PAIN: ICD-10-CM

## 2020-10-02 DIAGNOSIS — M25.462 SWELLING OF LEFT KNEE JOINT: ICD-10-CM

## 2020-10-02 DIAGNOSIS — M54.50 LUMBAGO: ICD-10-CM

## 2020-10-02 DIAGNOSIS — M54.2 CERVICALGIA: ICD-10-CM

## 2020-10-02 DIAGNOSIS — M25.562 LEFT KNEE PAIN: ICD-10-CM

## 2020-10-02 DIAGNOSIS — M54.50 LUMBAGO: Primary | ICD-10-CM

## 2020-10-02 PROCEDURE — 72070 X-RAY EXAM THORAC SPINE 2VWS: CPT | Mod: 26,,, | Performed by: RADIOLOGY

## 2020-10-02 PROCEDURE — 72070 XR THORACIC SPINE AP LATERAL: ICD-10-PCS | Mod: 26,,, | Performed by: RADIOLOGY

## 2020-10-02 PROCEDURE — 73562 X-RAY EXAM OF KNEE 3: CPT | Mod: 26,50,, | Performed by: RADIOLOGY

## 2020-10-02 PROCEDURE — 72070 X-RAY EXAM THORAC SPINE 2VWS: CPT | Mod: TC,PO

## 2020-10-02 PROCEDURE — 72040 X-RAY EXAM NECK SPINE 2-3 VW: CPT | Mod: 26,,, | Performed by: RADIOLOGY

## 2020-10-02 PROCEDURE — 72040 XR CERVICAL SPINE AP LATERAL: ICD-10-PCS | Mod: 26,,, | Performed by: RADIOLOGY

## 2020-10-02 PROCEDURE — 73562 X-RAY EXAM OF KNEE 3: CPT | Mod: TC,50,PO

## 2020-10-02 PROCEDURE — 72040 X-RAY EXAM NECK SPINE 2-3 VW: CPT | Mod: TC,PO

## 2020-10-02 PROCEDURE — 72100 X-RAY EXAM L-S SPINE 2/3 VWS: CPT | Mod: TC,PO

## 2020-10-02 PROCEDURE — 72100 XR LUMBAR SPINE AP AND LATERAL: ICD-10-PCS | Mod: 26,,, | Performed by: RADIOLOGY

## 2020-10-02 PROCEDURE — 73562 XR KNEE ORTHO BILAT: ICD-10-PCS | Mod: 26,50,, | Performed by: RADIOLOGY

## 2020-10-02 PROCEDURE — 72100 X-RAY EXAM L-S SPINE 2/3 VWS: CPT | Mod: 26,,, | Performed by: RADIOLOGY

## 2020-10-22 ENCOUNTER — HOSPITAL ENCOUNTER (OUTPATIENT)
Dept: RADIOLOGY | Facility: HOSPITAL | Age: 55
Discharge: HOME | End: 2020-10-22
Attending: DENTIST
Payer: MEDICAID

## 2020-10-22 DIAGNOSIS — Z77.090 PERSONAL HISTORY OF CONTACT WITH AND (SUSPECTED) EXPOSURE TO ASBESTOS: Primary | ICD-10-CM

## 2020-10-22 DIAGNOSIS — Z77.090 PERSONAL HISTORY OF CONTACT WITH AND (SUSPECTED) EXPOSURE TO ASBESTOS: ICD-10-CM

## 2020-10-22 PROCEDURE — 71046 XR CHEST PA AND LATERAL: ICD-10-PCS | Mod: 26,,, | Performed by: RADIOLOGY

## 2020-10-22 PROCEDURE — 71046 X-RAY EXAM CHEST 2 VIEWS: CPT | Mod: 26,,, | Performed by: RADIOLOGY

## 2020-10-22 PROCEDURE — 71046 X-RAY EXAM CHEST 2 VIEWS: CPT | Mod: TC,PO

## 2021-08-17 ENCOUNTER — HOSPITAL ENCOUNTER (OUTPATIENT)
Dept: RADIOLOGY | Facility: HOSPITAL | Age: 56
Discharge: HOME | End: 2021-08-17
Attending: NURSE PRACTITIONER
Payer: MEDICAID

## 2021-08-17 DIAGNOSIS — M67.431 GANGLION OF RIGHT WRIST: Primary | ICD-10-CM

## 2021-08-17 DIAGNOSIS — M67.431 GANGLION OF RIGHT WRIST: ICD-10-CM

## 2021-08-17 PROCEDURE — 73110 X-RAY EXAM OF WRIST: CPT | Mod: TC,PO,RT

## 2021-08-17 PROCEDURE — 73110 XR WRIST COMPLETE 3 VIEWS RIGHT: ICD-10-PCS | Mod: 26,RT,, | Performed by: RADIOLOGY

## 2021-08-17 PROCEDURE — 73110 X-RAY EXAM OF WRIST: CPT | Mod: 26,RT,, | Performed by: RADIOLOGY

## 2021-12-03 ENCOUNTER — HOSPITAL ENCOUNTER (EMERGENCY)
Facility: HOSPITAL | Age: 56
Discharge: HOME | End: 2021-12-03
Attending: EMERGENCY MEDICINE
Payer: MEDICAID

## 2021-12-03 VITALS
TEMPERATURE: 99 F | RESPIRATION RATE: 24 BRPM | WEIGHT: 263 LBS | HEART RATE: 83 BPM | DIASTOLIC BLOOD PRESSURE: 74 MMHG | OXYGEN SATURATION: 97 % | BODY MASS INDEX: 39.86 KG/M2 | SYSTOLIC BLOOD PRESSURE: 116 MMHG | HEIGHT: 68 IN

## 2021-12-03 DIAGNOSIS — R06.00 DYSPNEA: ICD-10-CM

## 2021-12-03 DIAGNOSIS — Z72.0 TOBACCO ABUSE: Primary | ICD-10-CM

## 2021-12-03 DIAGNOSIS — J45.21 MILD INTERMITTENT ASTHMA WITH EXACERBATION: ICD-10-CM

## 2021-12-03 DIAGNOSIS — R06.02 SHORTNESS OF BREATH: ICD-10-CM

## 2021-12-03 LAB
ALBUMIN SERPL BCP-MCNC: 3.7 G/DL (ref 3.5–5.2)
ALP SERPL-CCNC: 70 U/L (ref 55–135)
ALT SERPL W/O P-5'-P-CCNC: 21 U/L (ref 10–44)
ANION GAP SERPL CALC-SCNC: 11 MMOL/L (ref 8–16)
AST SERPL-CCNC: 31 U/L (ref 10–40)
BASOPHILS # BLD AUTO: 0.03 K/UL (ref 0–0.2)
BASOPHILS NFR BLD: 0.5 % (ref 0–1.9)
BILIRUB SERPL-MCNC: 0.3 MG/DL (ref 0.1–1)
BNP SERPL-MCNC: <10 PG/ML (ref 0–99)
BUN SERPL-MCNC: 9 MG/DL (ref 6–20)
CALCIUM SERPL-MCNC: 9.9 MG/DL (ref 8.7–10.5)
CHLORIDE SERPL-SCNC: 106 MMOL/L (ref 95–110)
CO2 SERPL-SCNC: 22 MMOL/L (ref 23–29)
CREAT SERPL-MCNC: 0.9 MG/DL (ref 0.5–1.4)
CTP QC/QA: YES
DIFFERENTIAL METHOD: ABNORMAL
EOSINOPHIL # BLD AUTO: 0.2 K/UL (ref 0–0.5)
EOSINOPHIL NFR BLD: 2.5 % (ref 0–8)
ERYTHROCYTE [DISTWIDTH] IN BLOOD BY AUTOMATED COUNT: 15.6 % (ref 11.5–14.5)
EST. GFR  (AFRICAN AMERICAN): >60 ML/MIN/1.73 M^2
EST. GFR  (NON AFRICAN AMERICAN): >60 ML/MIN/1.73 M^2
GLUCOSE SERPL-MCNC: 88 MG/DL (ref 70–110)
HCT VFR BLD AUTO: 36.9 % (ref 37–48.5)
HGB BLD-MCNC: 12.5 G/DL (ref 12–16)
IMM GRANULOCYTES # BLD AUTO: 0.04 K/UL (ref 0–0.04)
IMM GRANULOCYTES NFR BLD AUTO: 0.6 % (ref 0–0.5)
LYMPHOCYTES # BLD AUTO: 3.1 K/UL (ref 1–4.8)
LYMPHOCYTES NFR BLD: 48.8 % (ref 18–48)
MCH RBC QN AUTO: 29.3 PG (ref 27–31)
MCHC RBC AUTO-ENTMCNC: 33.9 G/DL (ref 32–36)
MCV RBC AUTO: 87 FL (ref 82–98)
MONOCYTES # BLD AUTO: 0.6 K/UL (ref 0.3–1)
MONOCYTES NFR BLD: 8.5 % (ref 4–15)
NEUTROPHILS # BLD AUTO: 2.5 K/UL (ref 1.8–7.7)
NEUTROPHILS NFR BLD: 39.1 % (ref 38–73)
NRBC BLD-RTO: 0 /100 WBC
PLATELET # BLD AUTO: 321 K/UL (ref 150–450)
PMV BLD AUTO: 9.4 FL (ref 9.2–12.9)
POTASSIUM SERPL-SCNC: 4.3 MMOL/L (ref 3.5–5.1)
PROT SERPL-MCNC: 7.5 G/DL (ref 6–8.4)
RBC # BLD AUTO: 4.26 M/UL (ref 4–5.4)
SARS-COV-2 RDRP RESP QL NAA+PROBE: NEGATIVE
SODIUM SERPL-SCNC: 139 MMOL/L (ref 136–145)
TROPONIN I SERPL DL<=0.01 NG/ML-MCNC: <0.006 NG/ML (ref 0–0.03)
WBC # BLD AUTO: 6.44 K/UL (ref 3.9–12.7)

## 2021-12-03 PROCEDURE — 80053 COMPREHEN METABOLIC PANEL: CPT | Mod: ER | Performed by: EMERGENCY MEDICINE

## 2021-12-03 PROCEDURE — 25000242 PHARM REV CODE 250 ALT 637 W/ HCPCS: Mod: ER | Performed by: EMERGENCY MEDICINE

## 2021-12-03 PROCEDURE — 84484 ASSAY OF TROPONIN QUANT: CPT | Mod: ER | Performed by: EMERGENCY MEDICINE

## 2021-12-03 PROCEDURE — 63600175 PHARM REV CODE 636 W HCPCS: Mod: ER | Performed by: EMERGENCY MEDICINE

## 2021-12-03 PROCEDURE — 93010 ELECTROCARDIOGRAM REPORT: CPT | Mod: ,,, | Performed by: INTERNAL MEDICINE

## 2021-12-03 PROCEDURE — 83880 ASSAY OF NATRIURETIC PEPTIDE: CPT | Mod: ER | Performed by: EMERGENCY MEDICINE

## 2021-12-03 PROCEDURE — 93005 ELECTROCARDIOGRAM TRACING: CPT | Mod: ER

## 2021-12-03 PROCEDURE — 85025 COMPLETE CBC W/AUTO DIFF WBC: CPT | Mod: ER | Performed by: EMERGENCY MEDICINE

## 2021-12-03 PROCEDURE — 94640 AIRWAY INHALATION TREATMENT: CPT | Mod: ER

## 2021-12-03 PROCEDURE — 93010 EKG 12-LEAD: ICD-10-PCS | Mod: ,,, | Performed by: INTERNAL MEDICINE

## 2021-12-03 PROCEDURE — 99285 EMERGENCY DEPT VISIT HI MDM: CPT | Mod: 25,ER

## 2021-12-03 PROCEDURE — U0002 COVID-19 LAB TEST NON-CDC: HCPCS | Mod: ER | Performed by: EMERGENCY MEDICINE

## 2021-12-03 RX ORDER — LOSARTAN POTASSIUM 50 MG/1
50 TABLET ORAL DAILY
COMMUNITY

## 2021-12-03 RX ORDER — PREDNISONE 20 MG/1
40 TABLET ORAL
Status: COMPLETED | OUTPATIENT
Start: 2021-12-03 | End: 2021-12-03

## 2021-12-03 RX ORDER — HYDROCHLOROTHIAZIDE 12.5 MG/1
12.5 CAPSULE ORAL DAILY
COMMUNITY

## 2021-12-03 RX ORDER — HYDROCODONE BITARTRATE AND ACETAMINOPHEN 10; 325 MG/1; MG/1
1 TABLET ORAL
COMMUNITY

## 2021-12-03 RX ORDER — IPRATROPIUM BROMIDE AND ALBUTEROL SULFATE 2.5; .5 MG/3ML; MG/3ML
3 SOLUTION RESPIRATORY (INHALATION) EVERY 6 HOURS PRN
Qty: 120 EACH | Refills: 1 | Status: SHIPPED | OUTPATIENT
Start: 2021-12-03 | End: 2022-01-02

## 2021-12-03 RX ORDER — AMLODIPINE BESYLATE 5 MG/1
5 TABLET ORAL DAILY
COMMUNITY

## 2021-12-03 RX ORDER — ALBUTEROL SULFATE 90 UG/1
2 AEROSOL, METERED RESPIRATORY (INHALATION) EVERY 6 HOURS PRN
Qty: 18 G | Refills: 1 | Status: SHIPPED | OUTPATIENT
Start: 2021-12-03 | End: 2022-01-02

## 2021-12-03 RX ORDER — AMOXICILLIN AND CLAVULANATE POTASSIUM 875; 125 MG/1; MG/1
1 TABLET, FILM COATED ORAL 2 TIMES DAILY
COMMUNITY
Start: 2021-11-02 | End: 2021-12-03 | Stop reason: CLARIF

## 2021-12-03 RX ORDER — IPRATROPIUM BROMIDE AND ALBUTEROL SULFATE 2.5; .5 MG/3ML; MG/3ML
3 SOLUTION RESPIRATORY (INHALATION)
Status: COMPLETED | OUTPATIENT
Start: 2021-12-03 | End: 2021-12-03

## 2021-12-03 RX ORDER — PREDNISONE 20 MG/1
TABLET ORAL
Qty: 10 TABLET | Refills: 0 | Status: SHIPPED | OUTPATIENT
Start: 2021-12-03

## 2021-12-03 RX ORDER — CETIRIZINE HYDROCHLORIDE 10 MG/1
10 TABLET ORAL DAILY
COMMUNITY
Start: 2021-11-02

## 2021-12-03 RX ADMIN — IPRATROPIUM BROMIDE AND ALBUTEROL SULFATE 3 ML: 2.5; .5 SOLUTION RESPIRATORY (INHALATION) at 04:12

## 2021-12-03 RX ADMIN — PREDNISONE 40 MG: 20 TABLET ORAL at 04:12

## 2022-04-26 DIAGNOSIS — R68.89 MECHANICAL AND MOTOR PROBLEMS WITH INTERNAL ORGANS: ICD-10-CM

## 2022-04-26 DIAGNOSIS — Z13.1 SCREENING FOR DIABETES MELLITUS: ICD-10-CM

## 2022-04-26 DIAGNOSIS — R79.89 HYPOURICEMIA: ICD-10-CM

## 2022-04-26 DIAGNOSIS — I10 ESSENTIAL HYPERTENSION, MALIGNANT: ICD-10-CM

## 2022-04-26 DIAGNOSIS — E78.2 MIXED HYPERLIPIDEMIA: Primary | ICD-10-CM

## 2022-04-28 ENCOUNTER — HOSPITAL ENCOUNTER (OUTPATIENT)
Dept: RADIOLOGY | Facility: HOSPITAL | Age: 57
Discharge: HOME | End: 2022-04-28
Attending: NURSE PRACTITIONER
Payer: MEDICAID

## 2022-04-28 VITALS — WEIGHT: 263 LBS | BODY MASS INDEX: 39.86 KG/M2 | HEIGHT: 68 IN

## 2022-04-28 DIAGNOSIS — Z12.31 ENCOUNTER FOR SCREENING MAMMOGRAM FOR BREAST CANCER: ICD-10-CM

## 2022-04-28 PROCEDURE — 77067 SCR MAMMO BI INCL CAD: CPT | Mod: 26,,, | Performed by: RADIOLOGY

## 2022-04-28 PROCEDURE — 77063 BREAST TOMOSYNTHESIS BI: CPT | Mod: 26,,, | Performed by: RADIOLOGY

## 2022-04-28 PROCEDURE — 77063 MAMMO DIGITAL SCREENING BILAT WITH TOMO: ICD-10-PCS | Mod: 26,,, | Performed by: RADIOLOGY

## 2022-04-28 PROCEDURE — 77063 BREAST TOMOSYNTHESIS BI: CPT | Mod: TC,PO

## 2022-04-28 PROCEDURE — 77067 PR MAMMO, CAD, SCREENING, BILAT: ICD-10-PCS | Mod: 26,,, | Performed by: RADIOLOGY

## 2022-04-28 PROCEDURE — 77067 SCR MAMMO BI INCL CAD: CPT | Mod: TC,PO

## 2023-02-27 ENCOUNTER — HOSPITAL ENCOUNTER (EMERGENCY)
Facility: HOSPITAL | Age: 58
Discharge: HOME | End: 2023-02-27
Attending: EMERGENCY MEDICINE
Payer: MEDICARE

## 2023-02-27 VITALS
HEART RATE: 66 BPM | OXYGEN SATURATION: 98 % | BODY MASS INDEX: 40.56 KG/M2 | DIASTOLIC BLOOD PRESSURE: 71 MMHG | TEMPERATURE: 98 F | WEIGHT: 266.75 LBS | SYSTOLIC BLOOD PRESSURE: 113 MMHG | RESPIRATION RATE: 16 BRPM

## 2023-02-27 DIAGNOSIS — R07.9 CHEST PAIN: ICD-10-CM

## 2023-02-27 LAB
BNP SERPL-MCNC: <10 PG/ML (ref 0–99)
TROPONIN I SERPL DL<=0.01 NG/ML-MCNC: <0.006 NG/ML (ref 0–0.03)

## 2023-02-27 PROCEDURE — 93005 ELECTROCARDIOGRAM TRACING: CPT | Mod: ER

## 2023-02-27 PROCEDURE — 83880 ASSAY OF NATRIURETIC PEPTIDE: CPT | Mod: ER | Performed by: EMERGENCY MEDICINE

## 2023-02-27 PROCEDURE — 84484 ASSAY OF TROPONIN QUANT: CPT | Mod: ER | Performed by: EMERGENCY MEDICINE

## 2023-02-27 PROCEDURE — 99285 EMERGENCY DEPT VISIT HI MDM: CPT | Mod: 25,ER

## 2023-02-27 PROCEDURE — 93010 ELECTROCARDIOGRAM REPORT: CPT | Mod: ,,, | Performed by: INTERNAL MEDICINE

## 2023-02-27 PROCEDURE — 93010 EKG 12-LEAD: ICD-10-PCS | Mod: ,,, | Performed by: INTERNAL MEDICINE

## 2023-02-27 NOTE — ED PROVIDER NOTES
Encounter Date: 2023       History     Chief Complaint   Patient presents with    Chest pain     For 2 weeks; pt states she was supposed to get a cpap but she hasn't and maybe she isn't getting enough oxygen to her heart. Pt requesting chest xray.     Underlying history of hypertension, COPD, obesity, continues to smoke.  Sees Dr. Rodriguez of Cardiology but it has been over a year since she saw him.  No history of heart disease or venous thromboembolic disease.  She has been told she has some worsening of her emphysema and sleep apnea, recommended to get a CPAP mask but has not yet done so.  She has a report of some ongoing chest pains in her left upper chest radiating to her left arm for at least 2 weeks, worried that she might have fluid around her heart.  No prior known diagnosis of CHF, coronary disease, or other heart disease.  She was here in the building having some outpatient labs drawn as a routine and wanted to get checked out further because Dr. Rodriguez was not in the office when she went by there the other day.  No fever or sputum production.  No pleurisy or lower extremity symptoms.  No other complaints.    The history is provided by the patient. No  was used.   Review of patient's allergies indicates:   Allergen Reactions    Corn meal-luffa cylindrica frt      Past Medical History:   Diagnosis Date    Asthma     Emphysema, unspecified     Fibroids     Hypertension      Past Surgical History:   Procedure Laterality Date    CHOLECYSTECTOMY      DILATION AND CURETTAGE OF UTERUS      GALLBLADDER SURGERY       Family History   Problem Relation Age of Onset    Diabetes Mother     Hypertension Mother     Hypertension Father     Breast cancer Neg Hx     Cancer Neg Hx     Colon cancer Neg Hx     Eclampsia Neg Hx     Miscarriages / Stillbirths Neg Hx     Ovarian cancer Neg Hx      labor Neg Hx     Stroke Neg Hx      Social History     Tobacco Use    Smoking status: Every Day      Packs/day: 0.20     Types: Cigarettes    Smokeless tobacco: Never   Substance Use Topics    Alcohol use: Yes     Comment: social    Drug use: No     Review of Systems   Constitutional:  Negative for activity change, fatigue and fever.   HENT:  Negative for congestion, ear pain, facial swelling, nosebleeds, sinus pressure and sore throat.    Eyes:  Negative for pain, discharge, redness and visual disturbance.   Respiratory:  Negative for cough, choking, chest tightness, shortness of breath and wheezing.    Cardiovascular:  Positive for chest pain. Negative for palpitations and leg swelling.   Gastrointestinal:  Negative for abdominal distention, abdominal pain, nausea and vomiting.   Endocrine: Negative for heat intolerance, polydipsia and polyuria.   Genitourinary:  Negative for difficulty urinating, dysuria, flank pain, hematuria and urgency.   Musculoskeletal:  Negative for back pain, gait problem, joint swelling and myalgias.   Skin:  Negative for color change and rash.   Allergic/Immunologic: Negative for environmental allergies and food allergies.   Neurological:  Negative for dizziness, weakness, numbness and headaches.   Hematological:  Negative for adenopathy. Does not bruise/bleed easily.   Psychiatric/Behavioral:  Negative for agitation and behavioral problems. The patient is not nervous/anxious.    All other systems reviewed and are negative.    Physical Exam     Initial Vitals   BP Pulse Resp Temp SpO2   02/27/23 1051 02/27/23 1051 02/27/23 1051 02/27/23 1052 02/27/23 1051   123/66 79 16 98.4 °F (36.9 °C) 98 %      MAP       --                Physical Exam    Nursing note and vitals reviewed.  Constitutional: She appears well-developed and well-nourished. She is not diaphoretic. No distress.   Obese/ NAD   HENT:   Head: Normocephalic and atraumatic.   Mouth/Throat: No oropharyngeal exudate.   Eyes: Conjunctivae and EOM are normal. Pupils are equal, round, and reactive to light. Right eye exhibits no  discharge. Left eye exhibits no discharge. No scleral icterus.   Neck: Neck supple. No thyromegaly present. No tracheal deviation present. No JVD present.   Normal range of motion.  Cardiovascular:  Normal rate, regular rhythm, normal heart sounds and intact distal pulses.     Exam reveals no gallop and no friction rub.       No murmur heard.  Pulmonary/Chest: No stridor. No respiratory distress. She has no wheezes. She has no rhonchi. She has no rales. She exhibits no tenderness.   Decreased air entry but clear   Abdominal: Abdomen is soft. Bowel sounds are normal. She exhibits no distension and no mass. There is no abdominal tenderness. There is no rebound and no guarding.   Musculoskeletal:         General: No tenderness or edema. Normal range of motion.      Cervical back: Normal range of motion and neck supple.     Neurological: She is alert and oriented to person, place, and time. She has normal strength.   Skin: Skin is warm and dry. No rash and no abscess noted. No erythema.   Psychiatric: She has a normal mood and affect. Her behavior is normal. Judgment and thought content normal.       ED Course   Procedures  Labs Reviewed   TROPONIN I   B-TYPE NATRIURETIC PEPTIDE     EKG Readings: (Independently Interpreted)   Initial Reading: No STEMI. Rhythm: Normal Sinus Rhythm. Heart Rate: 76. Ectopy: No Ectopy. Conduction: Normal.   Normal sinus rhythm at 76 beats per minute, nonspecific ST and T-wave abnormality, no acute concerning change.     Imaging Results              X-Ray Chest PA And Lateral (Final result)  Result time 02/27/23 11:45:43      Final result by NED Javier Sr., MD (02/27/23 11:45:43)                   Impression:      Normal study.      Electronically signed by: Ervin Javier MD  Date:    02/27/2023  Time:    11:45               Narrative:    EXAMINATION:  XR CHEST PA AND LATERAL    CLINICAL HISTORY:  Chest Pain;    COMPARISON:  12/03/2021    FINDINGS:  The size and contour of the heart  are normal. The lungs are clear. There is no pneumothorax or pleural effusion.                                        12:46 PM Sleeping comfortably, vital signs stable.  Ready for discharge, follow-up with outpatient primary care and Cardiology.     Medications - No data to display                        Medical Decision Making  Problems Addressed:  Chest pain: acute illness or injury    Amount and/or Complexity of Data Reviewed  External Data Reviewed: labs.  Labs: ordered. Decision-making details documented in ED Course.  Radiology: ordered. Decision-making details documented in ED Course.  ECG/medicine tests: ordered and independent interpretation performed. Decision-making details documented in ED Course.    Risk  OTC drugs.             Clinical Impression:   Final diagnoses:  [R07.9] Chest pain        ED Disposition Condition    Discharge Stable          ED Prescriptions    None       Follow-up Information       Follow up With Specialties Details Why Contact Info    Ryder Leavitt MD Family Medicine  As needed [address] [place] [address]  [phone]      [facility] - Emergency Dept Emergency Medicine  As needed [address] [address] [address] [phone]             Bairon Bassett MD  02/27/23 7550

## 2023-02-27 NOTE — DISCHARGE INSTRUCTIONS
No significant abnormality detected today.  Follow-up with your regular doctor and cardiologist soon for a recheck.  Continues on medicines.

## 2023-09-19 ENCOUNTER — HOSPITAL ENCOUNTER (EMERGENCY)
Facility: HOSPITAL | Age: 58
Discharge: HOME | End: 2023-09-19
Attending: EMERGENCY MEDICINE
Payer: MEDICARE

## 2023-09-19 VITALS
HEIGHT: 67 IN | DIASTOLIC BLOOD PRESSURE: 106 MMHG | BODY MASS INDEX: 42.14 KG/M2 | SYSTOLIC BLOOD PRESSURE: 144 MMHG | WEIGHT: 268.5 LBS | RESPIRATION RATE: 18 BRPM | HEART RATE: 78 BPM | OXYGEN SATURATION: 98 % | TEMPERATURE: 99 F

## 2023-09-19 DIAGNOSIS — M54.2 NECK PAIN: ICD-10-CM
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
## 2023-09-19 DIAGNOSIS — I10 HYPERTENSION, UNSPECIFIED TYPE: ICD-10-CM

## 2023-09-19 DIAGNOSIS — S39.012A STRAIN OF LUMBAR REGION, INITIAL ENCOUNTER: ICD-10-CM

## 2023-09-19 PROCEDURE — 99284 EMERGENCY DEPT VISIT MOD MDM: CPT | Mod: ER

## 2023-09-19 RX ORDER — NAPROXEN 500 MG/1
500 TABLET ORAL 2 TIMES DAILY WITH MEALS
Qty: 60 TABLET | Refills: 0 | Status: SHIPPED | OUTPATIENT
Start: 2023-09-19

## 2023-09-19 RX ORDER — CYCLOBENZAPRINE HCL 10 MG
10 TABLET ORAL 3 TIMES DAILY PRN
Qty: 15 TABLET | Refills: 0 | Status: SHIPPED | OUTPATIENT
Start: 2023-09-19 | End: 2023-09-24
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
## 2023-09-20 NOTE — ED NOTES
Patient examined, evaluated, and educated on discharge prescriptions and instructions by Dr Karthik ALEXIS. [facility]

## 2024-03-19 ENCOUNTER — LAB VISIT (OUTPATIENT)
Dept: LAB | Facility: HOSPITAL | Age: 59
End: 2024-03-19
Attending: INTERNAL MEDICINE
Payer: MEDICARE

## 2024-03-19 DIAGNOSIS — E78.2 MIXED HYPERLIPIDEMIA: ICD-10-CM

## 2024-03-19 DIAGNOSIS — E03.9 MYXEDEMA HEART DISEASE: ICD-10-CM

## 2024-03-19 DIAGNOSIS — R07.9 CHEST PAIN, UNSPECIFIED: ICD-10-CM

## 2024-03-19 DIAGNOSIS — I51.9 MYXEDEMA HEART DISEASE: ICD-10-CM

## 2024-03-19 DIAGNOSIS — R07.9 CHEST PAIN, UNSPECIFIED: Primary | ICD-10-CM

## 2024-03-19 LAB
ALBUMIN SERPL BCP-MCNC: 3.5 G/DL (ref 3.5–5.2)
ALP SERPL-CCNC: 75 U/L (ref 55–135)
ALT SERPL W/O P-5'-P-CCNC: 13 U/L (ref 10–44)
ANION GAP SERPL CALC-SCNC: 11 MMOL/L (ref 8–16)
AST SERPL-CCNC: 20 U/L (ref 10–40)
BASOPHILS # BLD AUTO: 0.02 K/UL (ref 0–0.2)
BASOPHILS NFR BLD: 0.3 % (ref 0–1.9)
BILIRUB SERPL-MCNC: 0.3 MG/DL (ref 0.1–1)
BUN SERPL-MCNC: 10 MG/DL (ref 6–20)
CALCIUM SERPL-MCNC: 9.7 MG/DL (ref 8.7–10.5)
CHLORIDE SERPL-SCNC: 102 MMOL/L (ref 95–110)
CO2 SERPL-SCNC: 27 MMOL/L (ref 23–29)
CREAT SERPL-MCNC: 0.9 MG/DL (ref 0.5–1.4)
CRP SERPL-MCNC: 8.6 MG/L (ref 0–8.2)
DIFFERENTIAL METHOD BLD: ABNORMAL
EOSINOPHIL # BLD AUTO: 0.1 K/UL (ref 0–0.5)
EOSINOPHIL NFR BLD: 2.1 % (ref 0–8)
ERYTHROCYTE [DISTWIDTH] IN BLOOD BY AUTOMATED COUNT: 15.2 % (ref 11.5–14.5)
EST. GFR  (NO RACE VARIABLE): >60 ML/MIN/1.73 M^2
GLUCOSE SERPL-MCNC: 155 MG/DL (ref 70–110)
HCT VFR BLD AUTO: 36 % (ref 37–48.5)
HGB BLD-MCNC: 12.3 G/DL (ref 12–16)
IMM GRANULOCYTES # BLD AUTO: 0.03 K/UL (ref 0–0.04)
IMM GRANULOCYTES NFR BLD AUTO: 0.5 % (ref 0–0.5)
LYMPHOCYTES # BLD AUTO: 3 K/UL (ref 1–4.8)
LYMPHOCYTES NFR BLD: 46.1 % (ref 18–48)
MCH RBC QN AUTO: 31.1 PG (ref 27–31)
MCHC RBC AUTO-ENTMCNC: 34.2 G/DL (ref 32–36)
MCV RBC AUTO: 91 FL (ref 82–98)
MONOCYTES # BLD AUTO: 0.4 K/UL (ref 0.3–1)
MONOCYTES NFR BLD: 6.5 % (ref 4–15)
NEUTROPHILS # BLD AUTO: 2.9 K/UL (ref 1.8–7.7)
NEUTROPHILS NFR BLD: 44.5 % (ref 38–73)
NRBC BLD-RTO: 0 /100 WBC
PLATELET # BLD AUTO: 375 K/UL (ref 150–450)
PMV BLD AUTO: 8.7 FL (ref 9.2–12.9)
POTASSIUM SERPL-SCNC: 3.4 MMOL/L (ref 3.5–5.1)
PROT SERPL-MCNC: 7.2 G/DL (ref 6–8.4)
RBC # BLD AUTO: 3.96 M/UL (ref 4–5.4)
SODIUM SERPL-SCNC: 140 MMOL/L (ref 136–145)
T4 FREE SERPL-MCNC: 0.94 NG/DL (ref 0.71–1.51)
TSH SERPL DL<=0.005 MIU/L-ACNC: 1.73 UIU/ML (ref 0.4–4)
WBC # BLD AUTO: 6.59 K/UL (ref 3.9–12.7)

## 2024-03-19 PROCEDURE — 86140 C-REACTIVE PROTEIN: CPT | Mod: PO | Performed by: INTERNAL MEDICINE

## 2024-03-19 PROCEDURE — 80053 COMPREHEN METABOLIC PANEL: CPT | Mod: PO | Performed by: INTERNAL MEDICINE

## 2024-03-19 PROCEDURE — 85025 COMPLETE CBC W/AUTO DIFF WBC: CPT | Mod: PO | Performed by: INTERNAL MEDICINE

## 2024-03-19 PROCEDURE — 84443 ASSAY THYROID STIM HORMONE: CPT | Mod: PO | Performed by: INTERNAL MEDICINE

## 2024-03-19 PROCEDURE — 80061 LIPID PANEL: CPT | Performed by: INTERNAL MEDICINE

## 2024-03-19 PROCEDURE — 36415 COLL VENOUS BLD VENIPUNCTURE: CPT | Mod: PO | Performed by: INTERNAL MEDICINE

## 2024-03-19 PROCEDURE — 84439 ASSAY OF FREE THYROXINE: CPT | Mod: PO | Performed by: INTERNAL MEDICINE

## 2024-03-19 PROCEDURE — 84479 ASSAY OF THYROID (T3 OR T4): CPT | Performed by: INTERNAL MEDICINE

## 2024-03-20 DIAGNOSIS — Z12.31 SCREENING MAMMOGRAM FOR HIGH-RISK PATIENT: Primary | ICD-10-CM

## 2024-03-20 LAB
CHOLEST SERPL-MCNC: 163 MG/DL (ref 120–199)
CHOLEST/HDLC SERPL: 5.1 {RATIO} (ref 2–5)
HDLC SERPL-MCNC: 32 MG/DL (ref 40–75)
HDLC SERPL: 19.6 % (ref 20–50)
LDLC SERPL CALC-MCNC: 99.8 MG/DL (ref 63–159)
NONHDLC SERPL-MCNC: 131 MG/DL
TRIGL SERPL-MCNC: 156 MG/DL (ref 30–150)

## 2024-03-22 LAB — TOTAL TBC SERPL: 1.1 TBI (ref 0.8–1.3)
